# Patient Record
Sex: FEMALE | Race: WHITE | NOT HISPANIC OR LATINO
[De-identification: names, ages, dates, MRNs, and addresses within clinical notes are randomized per-mention and may not be internally consistent; named-entity substitution may affect disease eponyms.]

---

## 2018-08-06 ENCOUNTER — APPOINTMENT (OUTPATIENT)
Dept: SPINE | Facility: CLINIC | Age: 53
End: 2018-08-06
Payer: COMMERCIAL

## 2018-08-06 VITALS
DIASTOLIC BLOOD PRESSURE: 88 MMHG | SYSTOLIC BLOOD PRESSURE: 142 MMHG | HEART RATE: 73 BPM | WEIGHT: 162 LBS | HEIGHT: 69 IN | BODY MASS INDEX: 23.99 KG/M2

## 2018-08-06 DIAGNOSIS — Z87.19 PERSONAL HISTORY OF OTHER DISEASES OF THE DIGESTIVE SYSTEM: ICD-10-CM

## 2018-08-06 DIAGNOSIS — Z87.42 PERSONAL HISTORY OF OTHER DISEASES OF THE FEMALE GENITAL TRACT: ICD-10-CM

## 2018-08-06 DIAGNOSIS — Z86.79 PERSONAL HISTORY OF OTHER DISEASES OF THE CIRCULATORY SYSTEM: ICD-10-CM

## 2018-08-06 PROCEDURE — 99213 OFFICE O/P EST LOW 20 MIN: CPT

## 2018-08-06 RX ORDER — BUPRENORPHINE HYDROCHLORIDE, NALOXONE HYDROCHLORIDE 4; 1 MG/1; MG/1
4-1 FILM, SOLUBLE BUCCAL; SUBLINGUAL
Refills: 0 | Status: ACTIVE | COMMUNITY

## 2018-11-12 ENCOUNTER — APPOINTMENT (OUTPATIENT)
Dept: SPINE | Facility: CLINIC | Age: 53
End: 2018-11-12
Payer: COMMERCIAL

## 2018-11-12 VITALS
HEART RATE: 73 BPM | HEIGHT: 69 IN | SYSTOLIC BLOOD PRESSURE: 138 MMHG | DIASTOLIC BLOOD PRESSURE: 84 MMHG | BODY MASS INDEX: 24.44 KG/M2 | WEIGHT: 165 LBS

## 2018-11-12 DIAGNOSIS — Z98.890 OTHER SPECIFIED POSTPROCEDURAL STATES: ICD-10-CM

## 2018-11-12 PROCEDURE — 99213 OFFICE O/P EST LOW 20 MIN: CPT

## 2018-11-12 RX ORDER — BUPRENORPHINE HYDROCHLORIDE, NALOXONE HYDROCHLORIDE 4; 1 MG/1; MG/1
4-1 FILM, SOLUBLE BUCCAL; SUBLINGUAL
Refills: 0 | Status: ACTIVE | COMMUNITY

## 2019-12-03 ENCOUNTER — APPOINTMENT (OUTPATIENT)
Dept: UROGYNECOLOGY | Facility: CLINIC | Age: 54
End: 2019-12-03
Payer: COMMERCIAL

## 2019-12-03 DIAGNOSIS — Z87.898 PERSONAL HISTORY OF OTHER SPECIFIED CONDITIONS: ICD-10-CM

## 2019-12-03 DIAGNOSIS — Z82.49 FAMILY HISTORY OF ISCHEMIC HEART DISEASE AND OTHER DISEASES OF THE CIRCULATORY SYSTEM: ICD-10-CM

## 2019-12-03 DIAGNOSIS — N39.41 URGE INCONTINENCE: ICD-10-CM

## 2019-12-03 DIAGNOSIS — Z86.79 PERSONAL HISTORY OF OTHER DISEASES OF THE CIRCULATORY SYSTEM: ICD-10-CM

## 2019-12-03 DIAGNOSIS — R39.15 URGENCY OF URINATION: ICD-10-CM

## 2019-12-03 DIAGNOSIS — Z86.018 PERSONAL HISTORY OF OTHER BENIGN NEOPLASM: ICD-10-CM

## 2019-12-03 DIAGNOSIS — Z87.42 PERSONAL HISTORY OF OTHER DISEASES OF THE FEMALE GENITAL TRACT: ICD-10-CM

## 2019-12-03 DIAGNOSIS — Z87.828 PERSONAL HISTORY OF OTHER (HEALED) PHYSICAL INJURY AND TRAUMA: ICD-10-CM

## 2019-12-03 DIAGNOSIS — K59.00 CONSTIPATION, UNSPECIFIED: ICD-10-CM

## 2019-12-03 LAB
BILIRUB UR QL STRIP: NEGATIVE
CLARITY UR: CLEAR
COLLECTION METHOD: NORMAL
GLUCOSE UR-MCNC: NEGATIVE
HCG UR QL: 0.2 EU/DL
HGB UR QL STRIP.AUTO: NORMAL
KETONES UR-MCNC: NEGATIVE
LEUKOCYTE ESTERASE UR QL STRIP: NEGATIVE
NITRITE UR QL STRIP: NEGATIVE
PH UR STRIP: 5.5
PROT UR STRIP-MCNC: NEGATIVE
SP GR UR STRIP: 1.02

## 2019-12-03 PROCEDURE — 81003 URINALYSIS AUTO W/O SCOPE: CPT | Mod: QW

## 2019-12-03 PROCEDURE — 99204 OFFICE O/P NEW MOD 45 MIN: CPT | Mod: 25

## 2019-12-03 PROCEDURE — 51701 INSERT BLADDER CATHETER: CPT

## 2019-12-03 RX ORDER — METOPROLOL TARTRATE 25 MG/1
25 TABLET, FILM COATED ORAL
Refills: 0 | Status: ACTIVE | COMMUNITY

## 2019-12-03 RX ORDER — BUPRENORPHINE HYDROCHLORIDE, NALOXONE HYDROCHLORIDE 12; 3 MG/1; MG/1
FILM, SOLUBLE BUCCAL; SUBLINGUAL
Refills: 0 | Status: ACTIVE | COMMUNITY

## 2019-12-03 RX ORDER — ROSUVASTATIN CALCIUM 5 MG/1
TABLET, FILM COATED ORAL
Refills: 0 | Status: ACTIVE | COMMUNITY

## 2019-12-03 NOTE — OB HISTORY
[Vaginal ___] : [unfilled] vaginal delivery(s) [Definite ___ (Date)] : the last menstrual period was [unfilled] [Sexually Active] : is not sexually active

## 2019-12-03 NOTE — PHYSICAL EXAM
[Oriented x3] : oriented to person, place, and time [No Acute Distress] : in no acute distress [Symmetrical] : the neck was ~L symmetrical [Soft, Nontender] : the abdomen was soft and nontender [None] : no CVA tenderness [Warm and Dry] : was warm and dry to touch [Normal Gait] : gait was normal [Labia Majora] : were normal [Bartholin's Gland] : both Bartholin's glands were normal  [Labia Minora] : were normal [Normal Appearance] : general appearance was normal [No Bleeding] : there was no active vaginal bleeding [2] : 2 [Aa ____] : Aa [unfilled] [Ba ____] : Ba [unfilled] [GH ____] : GH [unfilled] [C ____] : C [unfilled] [TVL ____] : TVL  [unfilled] [PB ____] : PB [unfilled] [Ap ____] : Ap [unfilled] [Bp ____] : Bp [unfilled] [D ____] : D [unfilled] [] : 0 [Normal] : normal [Exam Deferred] : was deferred [Soft] :  the cervix was soft [Post Void Residual ____ml] : post void residual via catheterization was [unfilled] ml [Varicose Veins] : no varicose veins observed [Cough] : no cough [Distended] : not distended [Tenderness] : ~T no ~M abdominal tenderness observed [FreeTextEntry3] : neg CST, neg urethral hypermobility, uroflow low volume void for interpretation however normal curve and peak flow rate [Inguinal LAD] : no adenopathy was noted in the inguinal lymph nodes [FreeTextEntry4] : no cyst / lesion / mass / suburethral diverticulum [de-identified] : no appreciable masses

## 2019-12-03 NOTE — PROCEDURE
[Urgent Incontinence] : urgent incontinence [Straight Catheterization] : insertion of a straight catheter [Urinary Frequency] : urinary frequency [Patient] : the patient [Intraurethral 2% Lidocaine Gel ___ (cc)] : Local Anesthesia: [unfilled] cc of 2% Lidocaine Gel was administered intraurethrally  [None] : there were no complications with the catheter insertion [___ Fr Straight Tip] : a [unfilled] in [place] straight tip catheter [Culture] : culture [Clear] : clear [Urinalysis] : urinalysis [Cytology] : cytology [No Complications] : no complications [Tolerated Well] : the patient tolerated the procedure well [Post procedure instructions and information given] : Post procedure instructions and information were given and reviewed with patient. [1] : 1 [FreeTextEntry1] : catheterized to obtain PVR and uncontaminated specimen

## 2019-12-03 NOTE — LETTER BODY
[Dear  ___] : Dear  [unfilled], [Thank you very much for allowing me to participate in the care of this patient. If you have any questions, please do not hesitate to contact me] : Thank you very much for allowing me to participate in the care of this patient. If you have any questions, please do not hesitate to contact me. [Attached please find my note.] : Attached please find my note. [I had the pleasure of evaluating your patient, [unfilled]. Thank you for referring Ms. [unfilled] for consultation for ___] : I had the pleasure of evaluating your patient, [unfilled]. Thank you for referring Ms. [unfilled] for consultation for [unfilled].
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
## 2019-12-03 NOTE — ASSESSMENT
[FreeTextEntry1] : Phyllis is a pleasant 53 yo P1, presents with OAB-wet. On exam, her empty supine CST was neg, and she did not have positive urethral hypermobility. Her straight-cath PVR volume was normal and was sent for UA, UCx, cytol. On pelvic exam, there were no appreciable masses or lesions or cysts. Pelvic floor muscle contraction strength was present but weak. There was no levator or pelvic floor musculature banding, tightness, or tenderness. POPQ exam did not demonstrate clinically significant pelvic organ prolapse.\par \par The patient has urinary symptoms consistent with overactive bladder. The etiology of OAB was discussed. Management options including observation, behavioral modifications (dietary changes, monitoring fluid intake, bladder training, timed voids, use of pads/protective garments), kegels, PT, medications, PTNS, SNS, and bladder Botox were all reviewed. Specifically, we discussed lowering overall daily fluid intake, and dietary irritants such as coffee. We discussed timed voids to void more frequently and avoid a full bladder, as she only voids about 4 tiems a day and has urge leakage on the way. She was recently prescribed a bladder med per her pmd and would like to call me to tell me what it is - to discuss it, SEs, efficacy, and R/B/A optoions. Finally, she would like to kegel at home. All ques answered. RTO prn. \par \par \par \par

## 2019-12-03 NOTE — CHIEF COMPLAINT
[Questionnaire Received] : Patient questionnaire received [Poor Bladder Control] : poor bladder control [Urine Frequency] : urine frequency [Problems With Defecation] : problems with defecation

## 2019-12-04 ENCOUNTER — RESULT REVIEW (OUTPATIENT)
Age: 54
End: 2019-12-04

## 2019-12-04 LAB
APPEARANCE: CLEAR
BACTERIA: NEGATIVE
BILIRUBIN URINE: NEGATIVE
BLOOD URINE: NEGATIVE
COLOR: NORMAL
GLUCOSE QUALITATIVE U: NEGATIVE
HYALINE CASTS: 0 /LPF
KETONES URINE: NEGATIVE
LEUKOCYTE ESTERASE URINE: NEGATIVE
MICROSCOPIC-UA: NORMAL
NITRITE URINE: NEGATIVE
PH URINE: 5.5
PROTEIN URINE: NEGATIVE
RED BLOOD CELLS URINE: 0 /HPF
SPECIFIC GRAVITY URINE: 1.01
SQUAMOUS EPITHELIAL CELLS: 1 /HPF
URINE CYTOLOGY: NORMAL
UROBILINOGEN URINE: NORMAL
WHITE BLOOD CELLS URINE: 0 /HPF

## 2019-12-05 ENCOUNTER — RESULT REVIEW (OUTPATIENT)
Age: 54
End: 2019-12-05

## 2019-12-05 LAB — BACTERIA UR CULT: NORMAL

## 2022-02-04 ENCOUNTER — APPOINTMENT (OUTPATIENT)
Age: 57
End: 2022-02-04

## 2022-02-15 ENCOUNTER — APPOINTMENT (OUTPATIENT)
Age: 57
End: 2022-02-15

## 2022-03-20 NOTE — HISTORY OF PRESENT ILLNESS
[FreeTextEntry1] : 57 yo P1 last seen by myself in 2019 for OAB-wet symptoms, and at which time her Urogyn exam was essentially unremarkable. Specifically, we discussed lowering overall daily fluid intake, and dietary irritants such as coffee. We discussed timed voids to void more frequently and avoid a full bladder. She has been rx'd a bladder med by her PMD and was unsure of the name at that time. She wanted to try home PFEs/kegels. Presents today for consultation after 2 years.

## 2022-03-25 ENCOUNTER — APPOINTMENT (OUTPATIENT)
Dept: UROGYNECOLOGY | Facility: CLINIC | Age: 57
End: 2022-03-25